# Patient Record
Sex: FEMALE | Race: BLACK OR AFRICAN AMERICAN | NOT HISPANIC OR LATINO | Employment: UNEMPLOYED | URBAN - METROPOLITAN AREA
[De-identification: names, ages, dates, MRNs, and addresses within clinical notes are randomized per-mention and may not be internally consistent; named-entity substitution may affect disease eponyms.]

---

## 2021-09-08 ENCOUNTER — HOSPITAL ENCOUNTER (EMERGENCY)
Facility: CLINIC | Age: 31
Discharge: HOME | End: 2021-09-09
Attending: EMERGENCY MEDICINE | Admitting: EMERGENCY MEDICINE
Payer: COMMERCIAL

## 2021-09-08 DIAGNOSIS — R31.9 URINARY TRACT INFECTION WITH HEMATURIA, SITE UNSPECIFIED: ICD-10-CM

## 2021-09-08 DIAGNOSIS — R30.0 DYSURIA: ICD-10-CM

## 2021-09-08 DIAGNOSIS — N39.0 URINARY TRACT INFECTION WITH HEMATURIA, SITE UNSPECIFIED: ICD-10-CM

## 2021-09-08 LAB
ALBUMIN UR-MCNC: NEGATIVE MG/DL
APPEARANCE UR: ABNORMAL
BACTERIA #/AREA URNS HPF: ABNORMAL /HPF
BILIRUB UR QL STRIP: NEGATIVE
COLOR UR AUTO: ABNORMAL
GLUCOSE UR STRIP-MCNC: NEGATIVE MG/DL
HCG UR QL: NEGATIVE
HGB UR QL STRIP: ABNORMAL
KETONES UR STRIP-MCNC: NEGATIVE MG/DL
LEUKOCYTE ESTERASE UR QL STRIP: ABNORMAL
NITRATE UR QL: NEGATIVE
PH UR STRIP: 6.5 [PH] (ref 5–7)
RBC URINE: 3 /HPF
SP GR UR STRIP: 1 (ref 1–1.03)
SQUAMOUS EPITHELIAL: 5 /HPF
UROBILINOGEN UR STRIP-MCNC: NORMAL MG/DL
WBC URINE: 4 /HPF

## 2021-09-08 PROCEDURE — 81001 URINALYSIS AUTO W/SCOPE: CPT | Performed by: EMERGENCY MEDICINE

## 2021-09-08 PROCEDURE — 87086 URINE CULTURE/COLONY COUNT: CPT | Performed by: EMERGENCY MEDICINE

## 2021-09-08 PROCEDURE — 99283 EMERGENCY DEPT VISIT LOW MDM: CPT

## 2021-09-08 PROCEDURE — 81025 URINE PREGNANCY TEST: CPT | Performed by: EMERGENCY MEDICINE

## 2021-09-08 ASSESSMENT — MIFFLIN-ST. JEOR: SCORE: 1469.32

## 2021-09-09 VITALS
HEART RATE: 64 BPM | SYSTOLIC BLOOD PRESSURE: 104 MMHG | RESPIRATION RATE: 20 BRPM | TEMPERATURE: 98.7 F | WEIGHT: 165 LBS | DIASTOLIC BLOOD PRESSURE: 72 MMHG | HEIGHT: 65 IN | BODY MASS INDEX: 27.49 KG/M2 | OXYGEN SATURATION: 99 %

## 2021-09-09 PROCEDURE — 250N000013 HC RX MED GY IP 250 OP 250 PS 637: Performed by: EMERGENCY MEDICINE

## 2021-09-09 RX ORDER — CEPHALEXIN 500 MG/1
500 CAPSULE ORAL ONCE
Status: COMPLETED | OUTPATIENT
Start: 2021-09-09 | End: 2021-09-09

## 2021-09-09 RX ORDER — CEPHALEXIN 500 MG/1
500 CAPSULE ORAL 4 TIMES DAILY
Qty: 28 CAPSULE | Refills: 0 | Status: SHIPPED | OUTPATIENT
Start: 2021-09-09 | End: 2021-09-16

## 2021-09-09 RX ADMIN — CEPHALEXIN 500 MG: 500 CAPSULE ORAL at 02:21

## 2021-09-09 ASSESSMENT — ENCOUNTER SYMPTOMS
FLANK PAIN: 1
DYSURIA: 1
FEVER: 0
FREQUENCY: 1

## 2021-09-09 NOTE — ED PROVIDER NOTES
"  History   Chief Complaint:  Dysuria       HPI   Lisa Dalton is a 31 year old female who presents with dysuria. Per the patient, for the past two days she has had dysuria, right-sided flank pain, urinary frequency, and urinary urgency. She denies fever, vaginal symptoms, STD concerns, and a personal history of UTI.     Review of Systems   Constitutional: Negative for fever.   Genitourinary: Positive for dysuria, flank pain, frequency and urgency.        Negative for vaginal symptoms   All other systems reviewed and are negative.    Allergies:  The patient has no known allergies.     Medications:  The patient is not currently taking any prescribed medications.    Past Medical History:    The patient denies past medical history including UTI    Social History:  The patient was accompanied to the ER  Tobacco Use: History of hookah use.    Physical Exam     Patient Vitals for the past 24 hrs:   BP Temp Temp src Pulse Resp SpO2 Height Weight   09/09/21 0225 104/72 -- -- 64 20 99 % -- --   09/09/21 0145 108/63 -- -- 63 16 99 % -- --   09/08/21 2020 125/70 98.7  F (37.1  C) Temporal 80 16 100 % 1.651 m (5' 5\") 74.8 kg (165 lb)       Physical Exam  General: Alert, No distress. Nontoxic appearance  Head: No signs of trauma.   Mouth/Throat: Oropharynx moist.   Eyes: Conjunctivae are normal. Pupils are equal..   Neck: Normal range of motion.    CV: Appears well perfused.  Resp:No respiratory distress.   MSK: Normal range of motion. No obvious deformity.   Neuro: The patient is alert and interactive. LEON. Speech normal. GCS 15  Skin: No lesion or sign of trauma noted.   Psych: normal mood and affect. behavior is normal.     Emergency Department Course     Laboratory:    UA with micro: Appearance Slightly Cloudy(A), Specific Gravity 1.001(L), Blood Large(A), Leukocyte Esterase Small(A), Bacteria Few(A), RBC 3(H), Squamous Epithelial 5(H) o/w WNL   HCG Qualitative Urine: Negative  Urine Culture: In Process    Emergency " Department Course:    Reviewed:  I reviewed nursing notes, vitals and past medical history    Assessments:  0208 I obtained history and examined the patient as noted above.     Interventions:  0221: Keflex, 500 mg, Oral    Disposition:  The patient was discharged to home.       Impression & Plan   Medical Decision Making:  Lisa Dalton is a 31 year old female who presents for evaluation of dysuria, flank pain, urgency, and frequency.  This clinically is consistent with a urinary tract infection.  Urinalysis confirms the infection.  There has been no fever, back/flank pain or significant abdominal pain.  There is no clinical evidence of pyelonephritis, appendicitis, colitis, diverticulitis or any intraabdominal catastrophe.   The patient notes some chest discomfort with breathing but does not want to be evaluated for this here at the ED. The patient will be started on antibiotics for the infection, first dose given here in the ED. Return if increasing pain, vomiting, fever, or inability to tolerate the oral antibiotic.  Follow up with primary physician is indicated if not improving in 2-3 days.     Diagnosis:    ICD-10-CM    1. Dysuria  R30.0    2. Urinary tract infection with hematuria, site unspecified  N39.0     R31.9        Discharge Medications:  Discharge Medication List as of 9/9/2021  2:21 AM      START taking these medications    Details   cephALEXin (KEFLEX) 500 MG capsule Take 1 capsule (500 mg) by mouth 4 times daily for 7 days, Disp-28 capsule, R-0, Local Print             Scribe Disclosure:  Hermelindo ZUÑIGA, am serving as a scribe at 1:47 AM on 9/9/2021 to document services personally performed by Jayesh Bridges MD based on my observations and the provider's statements to me.        Jayesh Bridges MD  09/09/21 0548

## 2021-09-10 ENCOUNTER — TELEPHONE (OUTPATIENT)
Dept: EMERGENCY MEDICINE | Facility: CLINIC | Age: 31
End: 2021-09-10

## 2021-09-10 LAB — BACTERIA UR CULT: NO GROWTH

## 2021-09-10 NOTE — RESULT ENCOUNTER NOTE
"Final urine culture report shows \"NO GROWTH\" and is NEGATIVE.  [facility] Emergency Dept discharge antibiotic: Cephalexin (Keflex) 500 mg capsule, 1 capsule (500 mg) by mouth 4 times daily for 7 days.  [facility] Emergency Dept discharge Rx antibiotic for UTI only (Yes/No): Yes  Patient took antibiotic within 3 days prior to urine culture collection (Yes/no): Unknown  Recommendations in treatment per [facility] ED Lab result Urine culture protocol.    "

## 2021-09-10 NOTE — TELEPHONE ENCOUNTER
"[facility] Emergency Department/Urgent Care Lab result notification:    Reason for call  Notify of lab results, assess symptoms,  review ED providers recommendations (if necessary) and advise per ED lab result f/u protocol.    Lab result  Final urine culture report shows \"NO GROWTH\" and is NEGATIVE.  [facility] Emergency Dept discharge antibiotic: Cephalexin (Keflex) 500 mg capsule, 1 capsule (500 mg) by mouth 4 times daily for 7 days.  [facility] Emergency Dept discharge Rx antibiotic for UTI only (Yes/No): Yes  Patient took antibiotic within 3 days prior to urine culture collection (Yes/no): No  Recommendations in treatment per [facility] ED Lab result Urine culture protocol.      5:26 Spoke with patient she states that she noticed after taking the antibiotic that her symptoms went away. Did discuss no growth urine culture indicates no infection. She also states since taking the antibiotic she has headaches. Did recommend that she discontinue the Keflex, and if she notices returning of symptoms or headache does not improve to be seen, in clinic/urgent care/ER   Patient stated understanding.    Carol Garcia, REINA  [facility] Result RN  [facility] Emergency Dept Lab Result RN  Ph# [phone]    "

## 2024-01-27 ENCOUNTER — OFFICE VISIT (OUTPATIENT)
Dept: URGENT CARE | Facility: URGENT CARE | Age: 34
End: 2024-01-27

## 2024-01-27 VITALS
RESPIRATION RATE: 18 BRPM | DIASTOLIC BLOOD PRESSURE: 80 MMHG | HEART RATE: 74 BPM | WEIGHT: 161 LBS | OXYGEN SATURATION: 100 % | TEMPERATURE: 98 F | SYSTOLIC BLOOD PRESSURE: 114 MMHG | BODY MASS INDEX: 26.79 KG/M2

## 2024-01-27 DIAGNOSIS — B37.31 YEAST INFECTION OF THE VAGINA: ICD-10-CM

## 2024-01-27 DIAGNOSIS — N92.6 MISSED MENSES: ICD-10-CM

## 2024-01-27 DIAGNOSIS — R30.0 DYSURIA: ICD-10-CM

## 2024-01-27 DIAGNOSIS — N76.0 BV (BACTERIAL VAGINOSIS): ICD-10-CM

## 2024-01-27 DIAGNOSIS — Z20.822 SUSPECTED 2019 NOVEL CORONAVIRUS INFECTION: Primary | ICD-10-CM

## 2024-01-27 DIAGNOSIS — B96.89 BV (BACTERIAL VAGINOSIS): ICD-10-CM

## 2024-01-27 LAB
ALBUMIN UR-MCNC: NEGATIVE MG/DL
APPEARANCE UR: CLEAR
BILIRUB UR QL STRIP: NEGATIVE
CLUE CELLS: PRESENT
COLOR UR AUTO: YELLOW
GLUCOSE UR STRIP-MCNC: NEGATIVE MG/DL
HCG UR QL: NEGATIVE
HGB UR QL STRIP: NEGATIVE
KETONES UR STRIP-MCNC: NEGATIVE MG/DL
LEUKOCYTE ESTERASE UR QL STRIP: NEGATIVE
NITRATE UR QL: NEGATIVE
PH UR STRIP: 5.5 [PH] (ref 5–7)
SP GR UR STRIP: <=1.005 (ref 1–1.03)
TRICHOMONAS, WET PREP: ABNORMAL
UROBILINOGEN UR STRIP-ACNC: 0.2 E.U./DL
WBC'S/HIGH POWER FIELD, WET PREP: ABNORMAL
YEAST, WET PREP: PRESENT

## 2024-01-27 PROCEDURE — 99214 OFFICE O/P EST MOD 30 MIN: CPT | Performed by: NURSE PRACTITIONER

## 2024-01-27 PROCEDURE — 87210 SMEAR WET MOUNT SALINE/INK: CPT | Performed by: NURSE PRACTITIONER

## 2024-01-27 PROCEDURE — 81025 URINE PREGNANCY TEST: CPT | Performed by: NURSE PRACTITIONER

## 2024-01-27 PROCEDURE — 87635 SARS-COV-2 COVID-19 AMP PRB: CPT | Performed by: NURSE PRACTITIONER

## 2024-01-27 PROCEDURE — 81003 URINALYSIS AUTO W/O SCOPE: CPT | Performed by: NURSE PRACTITIONER

## 2024-01-27 RX ORDER — METRONIDAZOLE 500 MG/1
500 TABLET ORAL 2 TIMES DAILY
Qty: 14 TABLET | Refills: 0 | Status: SHIPPED | OUTPATIENT
Start: 2024-01-27 | End: 2024-02-03

## 2024-01-27 RX ORDER — FLUCONAZOLE 150 MG/1
150 TABLET ORAL ONCE
Qty: 1 TABLET | Refills: 0 | Status: SHIPPED | OUTPATIENT
Start: 2024-01-27 | End: 2024-01-27

## 2024-01-27 NOTE — PROGRESS NOTES
Assessment & Plan     Suspected 2019 novel coronavirus infection  - Symptomatic COVID-19 Virus (Coronavirus) by PCR Nasopharyngeal    Dysuria  - UA Macroscopic with reflex to Microscopic and Culture - Clinic Collect  - Wet prep - Clinic Collect    Missed menses  - HCG qualitative urine    Yeast infection of the vagina  - fluconazole (DIFLUCAN) 150 MG tablet  Dispense: 1 tablet; Refill: 0    BV (bacterial vaginosis)  - metroNIDAZOLE (FLAGYL) 500 MG tablet  Dispense: 14 tablet; Refill: 0     Patient Instructions     Results for orders placed or performed in visit on 01/27/24   UA Macroscopic with reflex to Microscopic and Culture - Clinic Collect     Status: Normal    Specimen: Urine, Midstream   Result Value Ref Range    Color Urine Yellow Colorless, Straw, Light Yellow, Yellow    Appearance Urine Clear Clear    Glucose Urine Negative Negative mg/dL    Bilirubin Urine Negative Negative    Ketones Urine Negative Negative mg/dL    Specific Gravity Urine <=1.005 1.003 - 1.035    Blood Urine Negative Negative    pH Urine 5.5 5.0 - 7.0    Protein Albumin Urine Negative Negative mg/dL    Urobilinogen Urine 0.2 0.2, 1.0 E.U./dL    Nitrite Urine Negative Negative    Leukocyte Esterase Urine Negative Negative    Narrative    Microscopic not indicated   HCG qualitative urine     Status: Normal   Result Value Ref Range    hCG Urine Qualitative Negative Negative   Wet prep - Clinic Collect     Status: Abnormal    Specimen: Vagina; Swab   Result Value Ref Range    Trichomonas Absent Absent    Yeast Present (A) Absent    Clue Cells Present (A) Absent    WBCs/high power field 2+ (A) None     Your wet prep shows that you have bacterial vaginosis (overgrowth of a bacteria called clue cells).  This is not a sexually transmitted disease.  I sent in a prescription for Flagyl which is an antibiotic that you will take twice daily for one week.  You cannot drink alcohol while taking this medication.   You also have a yeast infection.  Take  diflucan x 1 dose.          Return in about 1 week (around 2/3/2024) for with regular provider if symptoms persist.    MARY ANNE Rawls CNP  [facility] Gonzalez is a 33 year old female who presents to clinic today for the following health issues:  Chief Complaint   Patient presents with    Urgent Care     Stomach pain and irregular period, feeling nausea, vomiting and diarrhea  a couple of days      HPI    GYN Complaint    Onset of symptoms was 1 week(s) ago.  Course of illness is worsening.    Severity moderate  Current and Associated symptoms: pelvic pain -  sharp and cramping, irregular menstrual cycles, and local irritation  Treatment measures tried include:  None  Sexually active: yes, single partner, contraception - condoms  Predisposing factors: None  Hx of previous symptom: rare    Review of Systems  Constitutional, HEENT, cardiovascular, pulmonary, gi and gu systems are negative, except as otherwise noted.      Objective    /80   Pulse 74   Temp 98  F (36.7  C)   Resp 18   Wt 73 kg (161 lb)   LMP 12/27/2023   SpO2 100%   BMI 26.79 kg/m    Physical Exam   GENERAL: alert and no distress  RESP: lungs clear to auscultation - no rales, rhonchi or wheezes  CV: regular rate and rhythm, normal S1 S2, no S3 or S4, no murmur, click or rub, no peripheral edema  ABDOMEN: soft, nontender, no hepatosplenomegaly, no masses and bowel sounds normal  MS: no gross musculoskeletal defects noted, no edema  BACK: no CVA tenderness, no paralumbar tenderness

## 2024-01-27 NOTE — PATIENT INSTRUCTIONS
Results for orders placed or performed in visit on 01/27/24   UA Macroscopic with reflex to Microscopic and Culture - Clinic Collect     Status: Normal    Specimen: Urine, Midstream   Result Value Ref Range    Color Urine Yellow Colorless, Straw, Light Yellow, Yellow    Appearance Urine Clear Clear    Glucose Urine Negative Negative mg/dL    Bilirubin Urine Negative Negative    Ketones Urine Negative Negative mg/dL    Specific Gravity Urine <=1.005 1.003 - 1.035    Blood Urine Negative Negative    pH Urine 5.5 5.0 - 7.0    Protein Albumin Urine Negative Negative mg/dL    Urobilinogen Urine 0.2 0.2, 1.0 E.U./dL    Nitrite Urine Negative Negative    Leukocyte Esterase Urine Negative Negative    Narrative    Microscopic not indicated   HCG qualitative urine     Status: Normal   Result Value Ref Range    hCG Urine Qualitative Negative Negative   Wet prep - Clinic Collect     Status: Abnormal    Specimen: Vagina; Swab   Result Value Ref Range    Trichomonas Absent Absent    Yeast Present (A) Absent    Clue Cells Present (A) Absent    WBCs/high power field 2+ (A) None     Your wet prep shows that you have bacterial vaginosis (overgrowth of a bacteria called clue cells).  This is not a sexually transmitted disease.  I sent in a prescription for Flagyl which is an antibiotic that you will take twice daily for one week.  You cannot drink alcohol while taking this medication.   You also have a yeast infection.  Take diflucan x 1 dose.

## 2024-01-29 LAB — SARS-COV-2 RNA RESP QL NAA+PROBE: NEGATIVE

## 2024-02-01 ENCOUNTER — OFFICE VISIT (OUTPATIENT)
Dept: URGENT CARE | Facility: URGENT CARE | Age: 34
End: 2024-02-01

## 2024-02-01 VITALS
WEIGHT: 165 LBS | HEART RATE: 78 BPM | TEMPERATURE: 98.1 F | BODY MASS INDEX: 27.46 KG/M2 | OXYGEN SATURATION: 99 % | SYSTOLIC BLOOD PRESSURE: 105 MMHG | DIASTOLIC BLOOD PRESSURE: 70 MMHG

## 2024-02-01 DIAGNOSIS — R10.84 ABDOMINAL PAIN, GENERALIZED: Primary | ICD-10-CM

## 2024-02-01 DIAGNOSIS — N92.6 LATE MENSES: ICD-10-CM

## 2024-02-01 LAB — HCG SERPL QL: NEGATIVE

## 2024-02-01 PROCEDURE — 84703 CHORIONIC GONADOTROPIN ASSAY: CPT

## 2024-02-01 PROCEDURE — 99212 OFFICE O/P EST SF 10 MIN: CPT

## 2024-02-01 PROCEDURE — 36415 COLL VENOUS BLD VENIPUNCTURE: CPT

## 2024-02-01 NOTE — PROGRESS NOTES
Assessment & Plan      Diagnosis Comments   1. Abdominal pain, generalized  HCG qualitative, Blood (UAQ129)       2. Late menses  HCG qualitative, Blood (DDM129)       Pregnancy test negative patient states she will follow-up with her primary care provider would recommend having imaging of abdomen if symptoms not improving may have ultrasound or CT scan additional lab workup may be necessary as well.  Patient verbalized understanding we discussed red flag symptoms that would warrant emergent or urgent evaluation.    Dannielle Meeks [facility] Gonzalez is a 33 year old female who presents to clinic today for the following health issues:  Chief Complaint   Patient presents with    Urgent Care     Abdominal pain, diarrhea x a week - says might be pregnant - seen 1/27 at [facility] had UA/WET PREP done   Requesting blood work      HPI    Patient presents to clinic states for the past week and a half she has had symptoms of intermittent diarrhea alternating with constipation generalized abdominal discomfort that comes and goes.  She states that she is late for her menstrual cycle by approximately a few days.  She notes that she did have unprotected intercourse and was seen in urgent care approximately 4 days ago she had a urine pregnancy test completed at that time that was negative.  She was found to have both bacterial vaginosis and yeast infection vaginal at that time and was treated she states the symptoms are feeling better.  She denies fever.  She states that diarrhea or bowel movements are brown to light brown in color.  She denies blood in urine or blood in stool.  Denies vomiting.  Patient states she does not have health insurance at this time and is usually seen through [facility] for [facility] primary [facility].  She is wanting to have a blood test for pregnancy done today she declines any other testing or imaging at this time.    Review of Systems  Constitutional, HEENT,  cardiovascular, pulmonary, gi and gu systems are negative, except as otherwise noted.      Objective    /70   Pulse 78   Temp 98.1  F (36.7  C) (Tympanic)   Wt 74.8 kg (165 lb)   LMP 12/27/2023   SpO2 99%   BMI 27.46 kg/m    Physical Exam   GENERAL: alert and no distress  NECK: no adenopathy, no asymmetry, masses, or scars  RESP: lungs clear to auscultation - no rales, rhonchi or wheezes  CV: regular rate and rhythm, normal S1 S2, no S3 or S4, no murmur, click or rub, no peripheral edema  ABDOMEN: soft, nontender, no hepatosplenomegaly, no masses and bowel sounds normal  MS: no gross musculoskeletal defects noted, no edema  SKIN: no suspicious lesions or rashes    Results for orders placed or performed in visit on 02/01/24   HCG qualitative, Blood ([id number])     Status: Normal   Result Value Ref Range    hCG Serum Qualitative Negative Negative

## 2024-03-09 ENCOUNTER — HEALTH MAINTENANCE LETTER (OUTPATIENT)
Age: 34
End: 2024-03-09

## 2025-03-16 ENCOUNTER — HEALTH MAINTENANCE LETTER (OUTPATIENT)
Age: 35
End: 2025-03-16